# Patient Record
Sex: FEMALE | Race: WHITE | NOT HISPANIC OR LATINO | Employment: UNEMPLOYED | URBAN - METROPOLITAN AREA
[De-identification: names, ages, dates, MRNs, and addresses within clinical notes are randomized per-mention and may not be internally consistent; named-entity substitution may affect disease eponyms.]

---

## 2020-02-06 ENCOUNTER — HOSPITAL ENCOUNTER (OUTPATIENT)
Dept: URGENT CARE | Facility: CLINIC | Age: 35
Discharge: HOME | End: 2020-02-06

## 2020-02-08 LAB — BACTERIA SPEC AEROBE CULT: NORMAL

## 2020-09-23 ENCOUNTER — CONVERSION ENCOUNTER (OUTPATIENT)
Dept: SURGERY | Facility: CLINIC | Age: 35
End: 2020-09-23

## 2020-09-23 ENCOUNTER — OFFICE VISIT CONVERTED (OUTPATIENT)
Dept: SURGERY | Facility: CLINIC | Age: 35
End: 2020-09-23
Attending: SURGERY

## 2020-09-28 ENCOUNTER — HOSPITAL ENCOUNTER (OUTPATIENT)
Dept: PREADMISSION TESTING | Facility: HOSPITAL | Age: 35
Discharge: HOME | End: 2020-09-28
Attending: SURGERY

## 2020-09-29 LAB — SARS-COV-2 RNA SPEC QL NAA+PROBE: NOT DETECTED

## 2020-10-02 ENCOUNTER — HOSPITAL ENCOUNTER (OUTPATIENT)
Dept: PERIOP | Facility: HOSPITAL | Age: 35
Setting detail: HOSPITAL OUTPATIENT SURGERY
Discharge: HOME | End: 2020-10-02
Attending: SURGERY

## 2020-10-02 LAB — HCG UR QL: NEGATIVE

## 2020-10-08 ENCOUNTER — OFFICE VISIT CONVERTED (OUTPATIENT)
Dept: SURGERY | Facility: CLINIC | Age: 35
End: 2020-10-08
Attending: SURGERY

## 2021-05-10 NOTE — H&P
"   History and Physical      Patient Name: Violeta Allen   Patient ID: [id number]   Sex: Female   YOB: 1985    Primary Care Provider: Juliette DAS   Referring Provider: Juliette DAS    Visit Date: October 8, 2020    Provider: Chrissy Espinosa MD   Location: [facility]   Location Address: [address] [place] [address]  [id number]   Location Phone: [phone]          Chief Complaint  · Follow Up Surgery      History Of Present Illness  Violeta Allen is a 35 year old female who is here today for follow up of: Ventral Hernia Repair.   She is doing well-status post surgery.       Medication List  Zoloft 50 mg oral tablet         Allergy List  NO KNOWN DRUG ALLERGIES       Allergies Reconciled  Social History  Tobacco (Never)         Review of Systems  · Constitutional  o Denies  o : fever, chills  · Eyes  o Denies  o : yellowish discoloration of the eyes, eye pain  · HENT  o Denies  o : difficulty swallowing, hoarseness  · Cardiovascular  o Denies  o : chest pain on exertion, irregular heart beats  · Respiratory  o Denies  o : shortness of breath, cough  · Gastrointestinal  o Denies  o : nausea, vomiting, diarrhea, constipation  · Integument  o Admits  o : see [facility] for surgical incision healing  · Neurologic  o Denies  o : tingling or numbness, loss of balance  · Musculoskeletal  o Denies  o : joint pain, back pain  · Endocrine  o Denies  o : weight gain, weight loss  · All Others Negative      Vitals  Date Time BP Position Site L\R Cuff Size HR RR TEMP (F) WT  HT  BMI kg/m2 BSA m2 O2 Sat FR L/min FiO2        10/08/2020 09:25 AM       14  147lbs 16oz 5'  4\" 25.4 1.74             Physical Examination  · Constitutional  o Appearance  o : well developed/well nourished patient in no apparent distress  · Respiratory  o Inspection of Chest  o : equal breaths bilaterally  · Gastrointestinal  o Abdominal Examination  o : soft/nontender, nondistended, no organomegaly " appreciated  · Post Surgical Incision  o Surgical wound  o : healing well, no erythema              Assessment  · Postoperative Exam Following Surgery     V67.00/Z09      Plan  · Medications  o Medications have been Reconciled  o Transition of Care or Provider Policy  · Instructions  o Return to office with any issues.  o F/U PRN            Electronically Signed by: Chrissy Espinosa MD -Author on October 8, 2020 09:34:14 AM

## 2021-05-10 NOTE — H&P
"   History and Physical      Patient Name: Violeta Allen   Patient ID: [id number]   Sex: Female   YOB: 1985    Primary Care Provider: Juliette DAS   Referring Provider: Juliette DAS    Visit Date: September 23, 2020    Provider: Chrissy Espinosa MD   Location: [facility] [place] [facility]   Location Address: [address] [place] [address]  [id number]   Location Phone: [phone]          Chief Complaint  · Outpatient History & Physical / Surgical Orders  · Pain/bulge in umbilicus      History Of Present Illness  Violeta Allen is a 35 year old female who is referred by Juliette DAS who complains of pain/bulge at umbilicus that is made worse by heavy lifting/straining. Reports no inability to have bowel movements.       Medication List  Zoloft 50 mg oral tablet         Allergy List  NO KNOWN DRUG ALLERGIES       Allergies Reconciled  Social History  Tobacco (Never)         Review of Systems  · Cardiovascular  o Denies  o : chest pain on exertion, shortness of breath, lower extremity swelling  · Respiratory  o Denies  o : wheezing, chronic cough, coughing up blood  · Gastrointestinal  o Denies  o : chronic abdominal pain, reflux symptoms, diarrhea      Vitals  Date Time BP Position Site L\R Cuff Size HR RR TEMP (F) WT  HT  BMI kg/m2 BSA m2 O2 Sat HC       09/23/2020 10:05 AM       14  145lbs 0oz 5'  4\" 24.89 1.72           Physical Examination  · Constitutional  o Appearance  o : well developed/well nourished patient in no apparent distress  · Respiratory  o Auscultation of Lungs  o : equal breaths bilaterally  · Gastrointestinal  o Abdominal Examination  o : soft/nontender, nondistended, no organomegaly appreciated  o Hernias  o : easily palpable umbilical hernia, easy to reduce          Assessment  · Hernia, umbilical     553.1/K42.9      Plan  · Orders  o [facility] [place] Pre-Op Covid-19 Screening (29917) - 553.1/K42.9 - 09/28/2020  o General Surgery Order (GENOR) - 553.1/K42.9 - " 10/02/2020  · Medications  o Medications have been Reconciled  o Transition of Care or Provider Policy  · Instructions  o Surgical Facility: [facility]  o PLAN: Proceed with repair of umbilical hernia. Patient understands risk include, but are not limited to, urinary retention, bleeding, infection, damage to surrounding structures, including but not limited to small bowel injury and is willing to proceed. Patient also understands prosthetic material may be indicated for repair of the defect.  o ****Surgical Orders****  o RISK AND BENEFITS:  o Consent for surgery: Given these options, the patient has verbally expressed an understanding of the risks of surgery and finds these risks acceptable. We will proceed with surgery as soon as possible.  o PREP: Per protocol  o IV: Per Anesthesia  o Please sign permit for: Laparoscopic umbilical hernia repair with mesh, possible open  o *******************************************  o PRE- OP MEDICATION ORDER:  o *******************************************  o Kefzol 2 grams IV on call to OR.  o The above History and Physical Examination has been completed within 30 days of admission.  o ****Patient Status****  o Outpatient  · Referrals  o ID: [id number] Date: 09/21/2020 Type: Inbound  Specialty: General Surgery            Electronically Signed by: Chrissy Espinosa MD -Author on September 23, 2020 10:14:55 AM

## 2021-05-14 VITALS — RESPIRATION RATE: 14 BRPM | HEIGHT: 64 IN | WEIGHT: 145 LBS | BODY MASS INDEX: 24.75 KG/M2

## 2021-05-14 VITALS — BODY MASS INDEX: 25.27 KG/M2 | HEIGHT: 64 IN | WEIGHT: 148 LBS | RESPIRATION RATE: 14 BRPM
